# Patient Record
Sex: FEMALE | Race: AMERICAN INDIAN OR ALASKA NATIVE
[De-identification: names, ages, dates, MRNs, and addresses within clinical notes are randomized per-mention and may not be internally consistent; named-entity substitution may affect disease eponyms.]

---

## 2018-01-09 ENCOUNTER — HOSPITAL ENCOUNTER (EMERGENCY)
Dept: HOSPITAL 5 - ED | Age: 48
LOS: 1 days | Discharge: ANOTHER HOSPITAL | End: 2018-01-10
Payer: SELF-PAY
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
## 2018-01-09 DIAGNOSIS — T33.811A: ICD-10-CM

## 2018-01-09 DIAGNOSIS — T33.812A: Primary | ICD-10-CM

## 2018-01-09 DIAGNOSIS — F17.200: ICD-10-CM
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
## 2018-01-09 DIAGNOSIS — I10: ICD-10-CM

## 2018-01-09 LAB
APTT BLD: 32.2 SEC. (ref 24.2–36.6)
BASOPHILS # (AUTO): 0 K/MM3 (ref 0–0.1)
BASOPHILS NFR BLD AUTO: 0.3 % (ref 0–1.8)
BUN SERPL-MCNC: 9 MG/DL (ref 7–17)
BUN/CREAT SERPL: 23 %
CALCIUM SERPL-MCNC: 8.9 MG/DL (ref 8.4–10.2)
EOSINOPHIL # BLD AUTO: 0 K/MM3 (ref 0–0.4)
EOSINOPHIL NFR BLD AUTO: 0.4 % (ref 0–4.3)
HCT VFR BLD CALC: 32.7 % (ref 30.3–42.9)
HEMOLYSIS INDEX: 0
HGB BLD-MCNC: 10.9 GM/DL (ref 10.1–14.3)
INR PPP: 0.87 (ref 0.87–1.13)
LYMPHOCYTES # BLD AUTO: 1.2 K/MM3 (ref 1.2–5.4)
LYMPHOCYTES NFR BLD AUTO: 21.1 % (ref 13.4–35)
MCH RBC QN AUTO: 31 PG (ref 28–32)
MCHC RBC AUTO-ENTMCNC: 33 % (ref 30–34)
MCV RBC AUTO: 94 FL (ref 79–97)
MONOCYTES # (AUTO): 0.8 K/MM3 (ref 0–0.8)
MONOCYTES % (AUTO): 13.3 % (ref 0–7.3)
PLATELET # BLD: 273 K/MM3 (ref 140–440)
RBC # BLD AUTO: 3.49 M/MM3 (ref 3.65–5.03)

## 2018-01-09 PROCEDURE — 73620 X-RAY EXAM OF FOOT: CPT

## 2018-01-09 PROCEDURE — 84484 ASSAY OF TROPONIN QUANT: CPT

## 2018-01-09 PROCEDURE — 96374 THER/PROPH/DIAG INJ IV PUSH: CPT

## 2018-01-09 PROCEDURE — 36415 COLL VENOUS BLD VENIPUNCTURE: CPT

## 2018-01-09 PROCEDURE — 86140 C-REACTIVE PROTEIN: CPT

## 2018-01-09 PROCEDURE — 90471 IMMUNIZATION ADMIN: CPT

## 2018-01-09 PROCEDURE — 85730 THROMBOPLASTIN TIME PARTIAL: CPT

## 2018-01-09 PROCEDURE — 85610 PROTHROMBIN TIME: CPT

## 2018-01-09 PROCEDURE — 85025 COMPLETE CBC W/AUTO DIFF WBC: CPT

## 2018-01-09 PROCEDURE — 93970 EXTREMITY STUDY: CPT

## 2018-01-09 PROCEDURE — 85652 RBC SED RATE AUTOMATED: CPT

## 2018-01-09 PROCEDURE — 96376 TX/PRO/DX INJ SAME DRUG ADON: CPT

## 2018-01-09 PROCEDURE — 90715 TDAP VACCINE 7 YRS/> IM: CPT

## 2018-01-09 PROCEDURE — 99285 EMERGENCY DEPT VISIT HI MDM: CPT

## 2018-01-09 PROCEDURE — 82550 ASSAY OF CK (CPK): CPT

## 2018-01-09 PROCEDURE — 80048 BASIC METABOLIC PNL TOTAL CA: CPT

## 2018-01-10 VITALS — DIASTOLIC BLOOD PRESSURE: 80 MMHG | SYSTOLIC BLOOD PRESSURE: 158 MMHG

## 2018-01-10 LAB — CRP SERPL-MCNC: 4.4 MG/DL (ref 0–1.3)

## 2018-01-10 RX ADMIN — FENTANYL CITRATE PRN MCG: 50 INJECTION, SOLUTION INTRAMUSCULAR; INTRAVENOUS at 10:33

## 2018-01-10 RX ADMIN — FENTANYL CITRATE PRN MCG: 50 INJECTION, SOLUTION INTRAMUSCULAR; INTRAVENOUS at 05:31

## 2018-01-10 NOTE — XRAY REPORT
FINAL REPORT



EXAM:  XR FOOT BILAT 2V



HISTORY:  foot pain and swelling 



TECHNIQUE:  AP and lateral views of each foot were submitted.



FINDINGS:  

The left reveals diffuse soft tissue swelling particular around

the midfoot. There is no evidence of fracture or osteomyelitis.

There is no evidence of any arthritic changes.



The right foot also reveals diffuse soft tissue swelling around

much of the foot particularly in the midfoot. There is no

evidence of fracture or osteomyelitis. There is no significant

arthritic changes.



IMPRESSION:  

Extensive bilateral swelling both feet.



No evidence of fracture or osteomyelitis in either foot.

## 2018-01-10 NOTE — EMERGENCY DEPARTMENT REPORT
ED General Adult HPI





- General


Chief complaint: Extremity Problem,Nontraumatic


Stated complaint: L LEG SWOLLEN, R LEG SORES


Time Seen by Provider: 01/10/18 02:38


Source: patient, EMS (ems notes not available at time of  chart dictation), RN 

notes reviewed


Mode of arrival: Ambulatory


Limitations: No Limitations, Physical Limitation





- History of Present Illness


Initial comments: 





This is a 47-year-old female who was previously unknown to this provider, 

patient reports that she is undomiciled, and recently got caught outside in the 

extreme cold.  She further reports that since then, she has been experiencing 

sharp and burning bilateral foot pain, which does not radiate anywhere, 

increases with palpation and decreases with rest.  Denies headache, neck pain, 

chest pain, abdominal pain, shortness of breath.  Also endorses bilateral lower 

extremity swelling.  Reports that she is not pregnant.


-: Gradual, days(s)


Location: left, right, lower extremity


Radiation: non-radiation


Severity scale (0 -10): 8


Quality: burning, stabbing, aching, sharp


Consistency: constant


Improves with: rest


Worsens with: movement


Associated Symptoms: weakness.  denies: confusion, chest pain, cough, 

diaphoresis, fever/chills, headaches, loss of appetite, malaise, nausea/vomiting

, shortness of breath, syncope





- Related Data


 Home Medications











 Medication  Instructions  Recorded  Confirmed  Last Taken


 


No Known Home Medications [No  01/10/18 01/10/18 Unknown





Reported Home Medications]    











 Allergies











Allergy/AdvReac Type Severity Reaction Status Date / Time


 


No Known Allergies Allergy   Unverified 18 09:18














ED Review of Systems


ROS: 


Stated complaint: L LEG SWOLLEN, R LEG SORES


Other details as noted in HPI








ED Past Medical Hx





- Past Medical History


Previous Medical History?: Yes


Hx Hypertension: Yes





- Surgical History


Past Surgical History?: Yes


Additional Surgical History: Tubaligation, Tonsilectomy





- Social History


Smoking Status: Current Every Day Smoker


Substance Use Type: Alcohol





- Medications


Home Medications: 


 Home Medications











 Medication  Instructions  Recorded  Confirmed  Last Taken  Type


 


No Known Home Medications [No  01/10/18 01/10/18 Unknown History





Reported Home Medications]     














ED Physical Exam





- General


Limitations: Physical Limitation


General appearance: alert, in no apparent distress





- Head


Head exam: Present: atraumatic, normocephalic





- Eye


Eye exam: Present: normal appearance, EOMI.  Absent: nystagmus





- ENT


ENT exam: Present: normal exam, normal orophraynx, mucous membranes moist, 

normal external ear exam





- Neck


Neck exam: Present: normal inspection, full ROM





- Respiratory


Respiratory exam: Present: normal lung sounds bilaterally.  Absent: respiratory 

distress





- Cardiovascular


Cardiovascular Exam: Present: regular rate, normal rhythm, normal heart sounds.

  Absent: systolic murmur, diastolic murmur, rubs, gallop





- GI/Abdominal


GI/Abdominal exam: Present: soft, normal bowel sounds.  Absent: distended, 

tenderness, guarding, rebound, rigid, pulsatile mass





- Extremities Exam


Extremities exam: Present: full ROM, tenderness, pedal edema, other (there is 2

+ edema in the bilateral lower extremities.  On the bilateral feet there are 

extensive blisters.  The bilateral toes, 1 through 5 on each foot, have 

circumferential swelling.  On the right lateral ankle, there is area of skin 

avulsion and breakdown.  There is clear serous discharge noted from multiple 

blisters.  There are no eschars.).  Absent: calf tenderness





- Back Exam


Back exam: Present: normal inspection, full ROM.  Absent: paraspinal tenderness

, vertebral tenderness





- Neurological Exam


Neurological exam: Present: alert, oriented X3, CN II-XII intact, abnormal gait 

(patient walks with a 2 person assist), other (Extraocular movements intact.  

Tongue midline.  No facial droop.  Facial sensation intact to light touch in 

the V1, V2, V3 distribution bilaterally.  5 and 5 strength in 4 extremities..  

Sensation is intact to light touch in 4 extremities.).  Absent: motor sensory 

deficit





- Psychiatric


Psychiatric exam: Present: normal affect, normal mood





- Skin


Skin exam: Present: warm





ED Course


 Vital Signs











  18





  09:18 14:59 23:11


 


Temperature 97.7 F 98.1 F 98.2 F


 


Pulse Rate 88 94 H 99 H


 


Respiratory 18 16 14





Rate   


 


Blood Pressure 177/102  167/101


 


Blood Pressure  171/104 





[Left]   


 


O2 Sat by Pulse 100 100 100





Oximetry   














  01/10/18





  02:56


 


Temperature 


 


Pulse Rate 


 


Respiratory 20





Rate 


 


Blood Pressure 


 


Blood Pressure 





[Left] 


 


O2 Sat by Pulse 





Oximetry 














ED Medical Decision Making





- Lab Data


Result diagrams: 


 18 10:28





 18 10:28








 Vital Signs











  18





  09:18 14:59 23:11


 


Temperature 97.7 F 98.1 F 98.2 F


 


Pulse Rate 88 94 H 99 H


 


Respiratory 18 16 14





Rate   


 


Blood Pressure 177/102  167/101


 


Blood Pressure  171/104 





[Left]   


 


O2 Sat by Pulse 100 100 100





Oximetry   














 Lab Results











  18 Range/Units





  10:28 10:28 10:28 


 


WBC    5.8  (4.5-11.0)  K/mm3


 


RBC    3.49 L  (3.65-5.03)  M/mm3


 


Hgb    10.9  (10.1-14.3)  gm/dl


 


Hct    32.7  (30.3-42.9)  %


 


MCV    94  (79-97)  fl


 


MCH    31  (28-32)  pg


 


MCHC    33  (30-34)  %


 


RDW    15.2  (13.2-15.2)  %


 


Plt Count    273  (140-440)  K/mm3


 


Lymph % (Auto)    21.1  (13.4-35.0)  %


 


Mono % (Auto)    13.3 H  (0.0-7.3)  %


 


Eos % (Auto)    0.4  (0.0-4.3)  %


 


Baso % (Auto)    0.3  (0.0-1.8)  %


 


Lymph #    1.2  (1.2-5.4)  K/mm3


 


Mono #    0.8  (0.0-0.8)  K/mm3


 


Eos #    0.0  (0.0-0.4)  K/mm3


 


Baso #    0.0  (0.0-0.1)  K/mm3


 


Seg Neutrophils %    64.9  (40.0-70.0)  %


 


Seg Neutrophils #    3.8  (1.8-7.7)  K/mm3


 


PT   12.2   (12.2-14.9)  Sec.


 


INR   0.87   (0.87-1.13)  


 


APTT   32.2   (24.2-36.6)  Sec.


 


Sodium  140    (137-145)  mmol/L


 


Potassium  4.1    (3.6-5.0)  mmol/L


 


Chloride  100.8    ()  mmol/L


 


Carbon Dioxide  28    (22-30)  mmol/L


 


Anion Gap  15    mmol/L


 


BUN  9    (7-17)  mg/dL


 


Creatinine  0.4 L    (0.7-1.2)  mg/dL


 


Estimated GFR  > 60    ml/min


 


BUN/Creatinine Ratio  23    %


 


Glucose  78    ()  mg/dL


 


Calcium  8.9    (8.4-10.2)  mg/dL


 


Troponin T  < 0.010    (0.00-0.029)  ng/mL














- Radiology Data


Radiology results: report reviewed, image reviewed





 ** LIVE ** [facility]


 


 


 





CHRISTEN MORRIS   Female : 1970  [facility]# [id number]





18 10:01 - Radiology Dept. Note by MICA LYNCH


 [facility] Num: [id number]  : 1970  Patient Age: 47





VASCULAR LAB.PRELIMINARY REPORT.BLE VENOUS DUPLEX DONE. NO EVIDENCE OF DVT/SVT 

IN VESSELS VISUALIZED.ENLARGED LYMPH NODES SEEN IN BOTH GROINS.





Initialized on 18 10:01 - END OF NOTE

















X-ray of the foot, interpreted by radiology myself: No fracture, no dislocation

, extensive soft tissue swelling





- Medical Decision Making





Differential diagnosis, including would not limited to: Frostbite injury, cold 

injury, blisters





Assessment and plan: 47-year-old female, who is presenting with bilateral lower 

foot pain, swelling, blistering, after exposure to extreme cold.  This injury 

is concerning for second or third degree frost bite.





This hospital is out of burn center, and given the patient cannot ambulate, and 

appears to have a component of superinfection, she will benefit from transfer 

to a burn center.  Case was discussed with Dr. Ryan Murrieta of the [place] burn 

Center, who accepted the patient is a transfer.  Recommend Silvadene 

antibiotic.  Patient was informed, and she is amenable to transfer.  Laboratory 

studies reviewed and are otherwise unremarkable, x-ray of the foot does not 

demonstrate fracture or dislocation and only show soft tissue swelling which is 

consistent with the patient's physical examination.


Critical care attestation.: 


If time is entered above; I have spent that time in minutes in the direct care 

of this critically ill patient, excluding procedure time.








ED Disposition


Clinical Impression: 


 Frostbite of both lower extremities





Disposition: DC/TX-02 [id number]-TRM GEN HOSP IP


Is pt being admited?: No


Does the pt Need Aspirin: No


Condition: Good


Referrals: 


PRIMARY CARE,MD [Primary Care Provider] - 3-5 Days

## 2018-01-10 NOTE — EMERGENCY DEPARTMENT REPORT
Blank Doc





- Documentation


Documentation: 





Patient was reevaluated by me.  Patient's still has not received a bed 

assignment from [facility].  Patient will be transferred now to just still 

burn center and make an.  Patient was accepted by Dr. Lagunas

## 2018-03-12 ENCOUNTER — HOSPITAL ENCOUNTER (EMERGENCY)
Dept: HOSPITAL 5 - ED | Age: 48
LOS: 1 days | Discharge: HOME | End: 2018-03-13
Payer: COMMERCIAL

## 2018-03-12 DIAGNOSIS — F17.200: ICD-10-CM

## 2018-03-12 DIAGNOSIS — L02.612: Primary | ICD-10-CM

## 2018-03-12 DIAGNOSIS — I10: ICD-10-CM

## 2018-03-12 DIAGNOSIS — L02.611: ICD-10-CM

## 2018-03-12 PROCEDURE — 85025 COMPLETE CBC W/AUTO DIFF WBC: CPT

## 2018-03-12 PROCEDURE — 80048 BASIC METABOLIC PNL TOTAL CA: CPT

## 2018-03-12 PROCEDURE — 99283 EMERGENCY DEPT VISIT LOW MDM: CPT

## 2018-03-12 PROCEDURE — 36415 COLL VENOUS BLD VENIPUNCTURE: CPT

## 2018-03-13 VITALS — SYSTOLIC BLOOD PRESSURE: 118 MMHG | DIASTOLIC BLOOD PRESSURE: 58 MMHG

## 2018-03-13 LAB
BASOPHILS # (AUTO): 0 K/MM3 (ref 0–0.1)
BASOPHILS NFR BLD AUTO: 0.2 % (ref 0–1.8)
BUN SERPL-MCNC: 13 MG/DL (ref 7–17)
BUN/CREAT SERPL: 22 %
CALCIUM SERPL-MCNC: 8.3 MG/DL (ref 8.4–10.2)
EOSINOPHIL # BLD AUTO: 0.1 K/MM3 (ref 0–0.4)
EOSINOPHIL NFR BLD AUTO: 1.3 % (ref 0–4.3)
HCT VFR BLD CALC: 27.7 % (ref 30.3–42.9)
HEMOLYSIS INDEX: 0
HGB BLD-MCNC: 9.4 GM/DL (ref 10.1–14.3)
LYMPHOCYTES # BLD AUTO: 2 K/MM3 (ref 1.2–5.4)
LYMPHOCYTES NFR BLD AUTO: 27.4 % (ref 13.4–35)
MCH RBC QN AUTO: 31 PG (ref 28–32)
MCHC RBC AUTO-ENTMCNC: 34 % (ref 30–34)
MCV RBC AUTO: 92 FL (ref 79–97)
MONOCYTES # (AUTO): 0.7 K/MM3 (ref 0–0.8)
MONOCYTES % (AUTO): 9.2 % (ref 0–7.3)
PLATELET # BLD: 411 K/MM3 (ref 140–440)
RBC # BLD AUTO: 3.02 M/MM3 (ref 3.65–5.03)

## 2018-03-13 NOTE — EMERGENCY DEPARTMENT REPORT
ED General Adult HPI





- General


Chief complaint: Wound/Laceration


Stated complaint: PAIN/SWELLING BILATERAL FEET/LEGS


Time Seen by Provider: 03/13/18 06:28


Source: patient


Mode of arrival: Ambulatory


Limitations: No Limitations





- History of Present Illness


Initial comments: 





Patient is a 47-year-old female homeless presented to the ER complaining of 

wound on both feet that she sustained a few months back when she had frostbite.

  Patient had a skin graft in August at that time.  She stated that the wound 

was healing well but she used warm water recently and she started having some 

pain since then and she is worried that she might have an infection in.  

Patient denied any fever nausea or vomiting.  No other complaints.


Severity scale (0 -10): 0





- Related Data


 Home Medications











 Medication  Instructions  Recorded  Confirmed  Last Taken


 


No Known Home Medications [No  01/10/18 01/10/18 Unknown





Reported Home Medications]    











 Allergies











Allergy/AdvReac Type Severity Reaction Status Date / Time


 


No Known Allergies Allergy   Verified 01/10/18 03:27














ED Review of Systems


ROS: 


Stated complaint: PAIN/SWELLING BILATERAL FEET/LEGS


Other details as noted in HPI





Comment: All other systems reviewed and negative


Constitutional: denies: chills, fever


Respiratory: denies: cough, orthopnea, shortness of breath, SOB with exertion


Cardiovascular: denies: chest pain, palpitations


Gastrointestinal: denies: abdominal pain, nausea, vomiting, diarrhea


Genitourinary: denies: urgency, dysuria, frequency, hematuria





ED Past Medical Hx





- Past Medical History


Hx Hypertension: Yes





- Surgical History


Additional Surgical History: Tubaligation, Tonsilectomy





- Social History


Smoking Status: Current Every Day Smoker


Substance Use Type: None





- Medications


Home Medications: 


 Home Medications











 Medication  Instructions  Recorded  Confirmed  Last Taken  Type


 


No Known Home Medications [No  01/10/18 01/10/18 Unknown History





Reported Home Medications]     














ED Physical Exam





- General


Limitations: No Limitations


General appearance: alert, in no apparent distress, other (patient is sleeping 

comfortably in her bed in no acute distress.)





- Head


Head exam: Present: atraumatic, normocephalic





- Eye


Eye exam: Present: normal appearance, PERRL





- ENT


ENT exam: Present: normal exam, normal orophraynx, mucous membranes moist





- Neck


Neck exam: Present: normal inspection, full ROM.  Absent: tenderness, 

meningismus, lymphadenopathy





- Respiratory


Respiratory exam: Present: normal lung sounds bilaterally.  Absent: respiratory 

distress, wheezes, rales, rhonchi, stridor, chest wall tenderness, accessory 

muscle use, decreased breath sounds, prolonged expiratory





- Cardiovascular


Cardiovascular Exam: Present: regular rate, normal rhythm, normal heart sounds





- GI/Abdominal


GI/Abdominal exam: Present: soft, normal bowel sounds.  Absent: distended, 

tenderness, guarding, rebound, rigid, organomegaly, mass, bruit, pulsatile mass





- Extremities Exam


Extremities exam: Present: normal inspection, full ROM, normal capillary refill

, pedal edema, other (skin grafting both feet healing well with good 

granulation tissues, no greenish discharge or erythema or warmth).  Absent: 

tenderness, joint swelling, calf tenderness





- Back Exam


Back exam: Present: normal inspection, full ROM.  Absent: CVA tenderness (L)





- Neurological Exam


Neurological exam: Present: alert, oriented X3, CN II-XII intact, normal gait





- Skin


Skin exam: Present: warm, dry, normal color.  Absent: erythema, ecchymosis





ED Course





 Vital Signs











  03/13/18 03/13/18 03/13/18





  01:10 06:01 06:04


 


Temperature 99.2 F 99.2 F 


 


Pulse Rate 89 85 


 


Respiratory  20 20





Rate   


 


Blood Pressure 165/103  


 


Blood Pressure  159/93 





[Left]   


 


O2 Sat by Pulse 100 96 96





Oximetry   














- Reevaluation(s)


Reevaluation #1: 





03/13/18 06:38


 consult to help with shelter and help with his medication.





ED Medical Decision Making





- Lab Data


Result diagrams: 


 03/13/18 01:28





 03/13/18 01:28


Critical care attestation.: 


If time is entered above; I have spent that time in minutes in the direct care 

of this critically ill patient, excluding procedure time.








ED Disposition


Clinical Impression: 


 Wound check, abscess





Disposition: DC-01 TO HOME OR SELFCARE


Is pt being admited?: No


Condition: Stable


Instructions:  Graft Skin (On the skin)


Referrals: 


MINDY AYALA MD [Primary Care Provider] - 3-5 Days

## 2023-01-20 NOTE — VASCULAR LAB REPORT
LOWER EXTREMITY VENOUS DUPLEX:



REASON FOR EXAM: Swelling of the lower extremities.



COMMENTS ON THE RIGHT:

All veins visualized are freely compressible without evidence of

internal echogenicity.  Flow is spontaneous and phasic throughout.



Right inguinal adenopathy is noted.



COMMENTS ON THE LEFT:

All veins visualized are freely compressible without evidence of

internal echogenicity.  Flow is spontaneous and phasic throughout.



Left inguinal adenopathy is noted.



IMPRESSION:

No evidence of acute or chronic deep venous thrombosis in either

lower extremity.
not applicable